# Patient Record
Sex: FEMALE | Race: ASIAN | NOT HISPANIC OR LATINO | Employment: UNEMPLOYED | URBAN - METROPOLITAN AREA
[De-identification: names, ages, dates, MRNs, and addresses within clinical notes are randomized per-mention and may not be internally consistent; named-entity substitution may affect disease eponyms.]

---

## 2018-06-08 ENCOUNTER — WALK IN (OUTPATIENT)
Dept: URGENT CARE | Age: 23
End: 2018-06-08

## 2018-06-08 ENCOUNTER — LAB SERVICES (OUTPATIENT)
Dept: LAB | Age: 23
End: 2018-06-08

## 2018-06-08 VITALS
RESPIRATION RATE: 16 BRPM | TEMPERATURE: 97.9 F | HEART RATE: 96 BPM | SYSTOLIC BLOOD PRESSURE: 84 MMHG | DIASTOLIC BLOOD PRESSURE: 53 MMHG | OXYGEN SATURATION: 97 % | WEIGHT: 122.2 LBS

## 2018-06-08 DIAGNOSIS — R39.9 GU (GENITOURINARY) SYMPTOMS: Primary | ICD-10-CM

## 2018-06-08 DIAGNOSIS — N39.0 URINARY TRACT INFECTION WITHOUT HEMATURIA, SITE UNSPECIFIED: ICD-10-CM

## 2018-06-08 DIAGNOSIS — R39.9 GU (GENITOURINARY) SYMPTOMS: ICD-10-CM

## 2018-06-08 LAB
APPEARANCE UR: ABNORMAL
B-HCG UR QL: NEGATIVE
BACTERIA #/AREA URNS HPF: ABNORMAL /HPF
BILIRUB UR QL STRIP: NEGATIVE
COLOR UR: ABNORMAL
GLUCOSE UR STRIP-MCNC: ABNORMAL MG/DL
HGB UR QL STRIP: ABNORMAL
HYALINE CASTS #/AREA URNS LPF: ABNORMAL /LPF (ref 0–5)
KETONES UR STRIP-MCNC: ABNORMAL MG/DL
LEUKOCYTE ESTERASE UR QL STRIP: ABNORMAL
MUCOUS THREADS URNS QL MICRO: PRESENT
NITRITE UR QL STRIP: ABNORMAL
PH UR STRIP: ABNORMAL UNITS (ref 5–7)
PROT UR STRIP-MCNC: ABNORMAL MG/DL
RBC #/AREA URNS HPF: ABNORMAL /HPF (ref 0–3)
SP GR UR STRIP: 1.02 (ref 1–1.03)
SPECIMEN SOURCE: ABNORMAL
SQUAMOUS #/AREA URNS HPF: ABNORMAL /HPF (ref 0–5)
UROBILINOGEN UR STRIP-MCNC: ABNORMAL MG/DL (ref 0–1)
WBC #/AREA URNS HPF: ABNORMAL /HPF (ref 0–5)

## 2018-06-08 PROCEDURE — 87077 CULTURE AEROBIC IDENTIFY: CPT | Performed by: INTERNAL MEDICINE

## 2018-06-08 PROCEDURE — 99214 OFFICE O/P EST MOD 30 MIN: CPT | Performed by: EMERGENCY MEDICINE

## 2018-06-08 PROCEDURE — 81001 URINALYSIS AUTO W/SCOPE: CPT | Performed by: INTERNAL MEDICINE

## 2018-06-08 PROCEDURE — 87086 URINE CULTURE/COLONY COUNT: CPT | Performed by: INTERNAL MEDICINE

## 2018-06-08 PROCEDURE — 87186 SC STD MICRODIL/AGAR DIL: CPT | Performed by: INTERNAL MEDICINE

## 2018-06-08 PROCEDURE — 81025 URINE PREGNANCY TEST: CPT | Performed by: INTERNAL MEDICINE

## 2018-06-08 RX ORDER — CIPROFLOXACIN 500 MG/1
500 TABLET, FILM COATED ORAL 2 TIMES DAILY
Qty: 14 TABLET | Refills: 0 | Status: SHIPPED | OUTPATIENT
Start: 2018-06-08 | End: 2018-06-15

## 2018-06-10 LAB
BACTERIA UR CULT: ABNORMAL
ORGANISM: ABNORMAL
REPORT STATUS (RPT): ABNORMAL
SPECIMEN SOURCE: ABNORMAL

## 2018-11-19 ENCOUNTER — WALK IN (OUTPATIENT)
Dept: URGENT CARE | Age: 23
End: 2018-11-19

## 2018-11-19 VITALS
DIASTOLIC BLOOD PRESSURE: 50 MMHG | SYSTOLIC BLOOD PRESSURE: 93 MMHG | BODY MASS INDEX: 22.23 KG/M2 | TEMPERATURE: 97.8 F | HEART RATE: 66 BPM | OXYGEN SATURATION: 100 % | WEIGHT: 120.8 LBS | HEIGHT: 62 IN

## 2018-11-19 DIAGNOSIS — R10.9 ABDOMINAL CRAMPING: Primary | ICD-10-CM

## 2018-11-19 DIAGNOSIS — N92.0 MENORRHAGIA WITH REGULAR CYCLE: ICD-10-CM

## 2018-11-19 PROCEDURE — 99214 OFFICE O/P EST MOD 30 MIN: CPT | Performed by: EMERGENCY MEDICINE

## 2020-07-08 ENCOUNTER — TELEPHONE (OUTPATIENT)
Dept: URGENT CARE | Age: 25
End: 2020-07-08

## 2020-07-08 ENCOUNTER — WALK IN (OUTPATIENT)
Dept: URGENT CARE | Age: 25
End: 2020-07-08

## 2020-07-08 VITALS
SYSTOLIC BLOOD PRESSURE: 100 MMHG | DIASTOLIC BLOOD PRESSURE: 62 MMHG | RESPIRATION RATE: 16 BRPM | OXYGEN SATURATION: 97 % | HEART RATE: 88 BPM | TEMPERATURE: 97 F

## 2020-07-08 DIAGNOSIS — J02.9 SORE THROAT: Primary | ICD-10-CM

## 2020-07-08 LAB — S PYO DNA THROAT QL NAA+PROBE: DETECTED

## 2020-07-08 PROCEDURE — 99213 OFFICE O/P EST LOW 20 MIN: CPT | Performed by: PHYSICIAN ASSISTANT

## 2020-07-08 PROCEDURE — 87635 SARS-COV-2 COVID-19 AMP PRB: CPT | Performed by: CLINICAL MEDICAL LABORATORY

## 2020-07-08 PROCEDURE — 87651 STREP A DNA AMP PROBE: CPT | Performed by: INTERNAL MEDICINE

## 2020-07-08 RX ORDER — AMOXICILLIN 500 MG/1
500 CAPSULE ORAL 3 TIMES DAILY
Qty: 30 CAPSULE | Refills: 0 | Status: SHIPPED | OUTPATIENT
Start: 2020-07-08 | End: 2020-12-15

## 2020-07-09 LAB
SARS-COV-2 RNA RESP QL NAA+PROBE: NOT DETECTED
SERVICE CMNT-IMP: NORMAL

## 2020-12-15 ENCOUNTER — WALK IN (OUTPATIENT)
Dept: URGENT CARE | Age: 25
End: 2020-12-15

## 2020-12-15 VITALS
HEART RATE: 78 BPM | TEMPERATURE: 97 F | OXYGEN SATURATION: 98 % | WEIGHT: 129 LBS | BODY MASS INDEX: 23.59 KG/M2 | SYSTOLIC BLOOD PRESSURE: 118 MMHG | DIASTOLIC BLOOD PRESSURE: 60 MMHG | RESPIRATION RATE: 18 BRPM

## 2020-12-15 DIAGNOSIS — L30.9 DERMATITIS: Primary | ICD-10-CM

## 2020-12-15 PROCEDURE — 99214 OFFICE O/P EST MOD 30 MIN: CPT | Performed by: EMERGENCY MEDICINE

## 2020-12-15 RX ORDER — MOMETASONE FUROATE 1 MG/G
CREAM TOPICAL DAILY
Qty: 45 G | Refills: 0 | Status: SHIPPED | OUTPATIENT
Start: 2020-12-15

## 2021-02-16 ENCOUNTER — WALK IN (OUTPATIENT)
Dept: URGENT CARE | Age: 26
End: 2021-02-16

## 2021-02-16 VITALS
SYSTOLIC BLOOD PRESSURE: 106 MMHG | DIASTOLIC BLOOD PRESSURE: 68 MMHG | OXYGEN SATURATION: 96 % | HEART RATE: 97 BPM | RESPIRATION RATE: 16 BRPM | BODY MASS INDEX: 24.19 KG/M2 | WEIGHT: 132.28 LBS | TEMPERATURE: 98 F

## 2021-02-16 DIAGNOSIS — L30.9 DERMATITIS: Primary | ICD-10-CM

## 2021-02-16 PROCEDURE — 99213 OFFICE O/P EST LOW 20 MIN: CPT | Performed by: FAMILY MEDICINE

## 2021-02-16 RX ORDER — DESONIDE 0.5 MG/G
CREAM TOPICAL 2 TIMES DAILY
Qty: 30 G | Refills: 0 | Status: SHIPPED | OUTPATIENT
Start: 2021-02-16

## 2021-02-16 ASSESSMENT — ENCOUNTER SYMPTOMS
DIARRHEA: 0
WOUND: 0
WEAKNESS: 0
PHOTOPHOBIA: 0
FEVER: 0
COUGH: 0
CONSTIPATION: 0
SHORTNESS OF BREATH: 0
ACTIVITY CHANGE: 0
CHILLS: 0
WHEEZING: 0
CHEST TIGHTNESS: 0
EYE REDNESS: 0
VOMITING: 0
NAUSEA: 0
SORE THROAT: 0
RHINORRHEA: 0
DIZZINESS: 0
ABDOMINAL PAIN: 0
LIGHT-HEADEDNESS: 0
CONFUSION: 0
AGITATION: 0

## 2021-03-09 ENCOUNTER — WALK IN (OUTPATIENT)
Dept: URGENT CARE | Age: 26
End: 2021-03-09

## 2021-03-09 VITALS
BODY MASS INDEX: 23.79 KG/M2 | WEIGHT: 130.07 LBS | OXYGEN SATURATION: 100 % | DIASTOLIC BLOOD PRESSURE: 62 MMHG | SYSTOLIC BLOOD PRESSURE: 98 MMHG | HEART RATE: 85 BPM | RESPIRATION RATE: 16 BRPM | TEMPERATURE: 97.5 F

## 2021-03-09 DIAGNOSIS — M54.2 NECK PAIN ON LEFT SIDE: ICD-10-CM

## 2021-03-09 DIAGNOSIS — N39.0 ACUTE UTI: ICD-10-CM

## 2021-03-09 DIAGNOSIS — R82.90 URINE ABNORMALITY: Primary | ICD-10-CM

## 2021-03-09 LAB
APPEARANCE UR: ABNORMAL
BACTERIA #/AREA URNS HPF: ABNORMAL /HPF
BILIRUB UR QL STRIP: NEGATIVE
COLOR UR: YELLOW
GLUCOSE UR STRIP-MCNC: NEGATIVE MG/DL
HGB UR QL STRIP: NEGATIVE
HYALINE CASTS #/AREA URNS LPF: ABNORMAL /LPF
KETONES UR STRIP-MCNC: 15 MG/DL
LEUKOCYTE ESTERASE UR QL STRIP: ABNORMAL
MUCOUS THREADS URNS QL MICRO: PRESENT
NITRITE UR QL STRIP: POSITIVE
PH UR STRIP: 6 [PH] (ref 5–7)
PROT UR STRIP-MCNC: NEGATIVE MG/DL
RBC #/AREA URNS HPF: ABNORMAL /HPF
SP GR UR STRIP: 1.01 (ref 1–1.03)
SQUAMOUS #/AREA URNS HPF: ABNORMAL /HPF
UROBILINOGEN UR STRIP-MCNC: 1 MG/DL
WBC #/AREA URNS HPF: ABNORMAL /HPF

## 2021-03-09 PROCEDURE — 99213 OFFICE O/P EST LOW 20 MIN: CPT | Performed by: EMERGENCY MEDICINE

## 2021-03-09 PROCEDURE — 87077 CULTURE AEROBIC IDENTIFY: CPT | Performed by: CLINICAL MEDICAL LABORATORY

## 2021-03-09 PROCEDURE — 81001 URINALYSIS AUTO W/SCOPE: CPT | Performed by: INTERNAL MEDICINE

## 2021-03-09 PROCEDURE — 87086 URINE CULTURE/COLONY COUNT: CPT | Performed by: INTERNAL MEDICINE

## 2021-03-09 RX ORDER — NITROFURANTOIN 25; 75 MG/1; MG/1
100 CAPSULE ORAL 2 TIMES DAILY
Qty: 10 CAPSULE | Refills: 0 | Status: SHIPPED | OUTPATIENT
Start: 2021-03-09 | End: 2021-03-14

## 2021-03-10 LAB
BACTERIA UR CULT: ABNORMAL
BACTERIA UR CULT: ABNORMAL

## 2021-03-30 ENCOUNTER — OFFICE VISIT (OUTPATIENT)
Dept: DERMATOLOGY | Age: 26
End: 2021-03-30
Attending: FAMILY MEDICINE

## 2021-03-30 VITALS — WEIGHT: 126.98 LBS | BODY MASS INDEX: 23.23 KG/M2

## 2021-03-30 DIAGNOSIS — L30.9 DERMATITIS: Primary | ICD-10-CM

## 2021-03-30 DIAGNOSIS — L70.0 ACNE VULGARIS: ICD-10-CM

## 2021-03-30 PROCEDURE — 99203 OFFICE O/P NEW LOW 30 MIN: CPT | Performed by: STUDENT IN AN ORGANIZED HEALTH CARE EDUCATION/TRAINING PROGRAM

## 2021-08-29 ENCOUNTER — WALK IN (OUTPATIENT)
Dept: URGENT CARE | Age: 26
End: 2021-08-29

## 2021-08-29 VITALS
RESPIRATION RATE: 18 BRPM | OXYGEN SATURATION: 98 % | TEMPERATURE: 97.4 F | HEART RATE: 84 BPM | DIASTOLIC BLOOD PRESSURE: 69 MMHG | SYSTOLIC BLOOD PRESSURE: 99 MMHG

## 2021-08-29 DIAGNOSIS — H10.13 ALLERGIC CONJUNCTIVITIS OF BOTH EYES: Primary | ICD-10-CM

## 2021-08-29 PROCEDURE — 99213 OFFICE O/P EST LOW 20 MIN: CPT | Performed by: EMERGENCY MEDICINE

## 2021-08-29 RX ORDER — KETOTIFEN FUMARATE 0.25 MG/ML
1 SOLUTION/ DROPS OPHTHALMIC 2 TIMES DAILY
Qty: 5 ML | Refills: 1 | Status: SHIPPED | OUTPATIENT
Start: 2021-08-29

## 2021-09-23 ENCOUNTER — OFFICE VISIT (OUTPATIENT)
Dept: DERMATOLOGY | Age: 26
End: 2021-09-23

## 2021-09-23 DIAGNOSIS — L30.9 ECZEMA, UNSPECIFIED TYPE: Primary | ICD-10-CM

## 2021-09-23 PROCEDURE — 99214 OFFICE O/P EST MOD 30 MIN: CPT | Performed by: DERMATOLOGY

## 2021-09-23 RX ORDER — TRIAMCINOLONE ACETONIDE 1 MG/G
CREAM TOPICAL
Qty: 453.6 G | Refills: 3 | Status: SHIPPED | OUTPATIENT
Start: 2021-09-23

## 2024-08-20 ENCOUNTER — HOSPITAL ENCOUNTER (EMERGENCY)
Facility: CLINIC | Age: 29
Discharge: HOME | End: 2024-08-20
Attending: EMERGENCY MEDICINE | Admitting: EMERGENCY MEDICINE
Payer: MEDICAID

## 2024-08-20 VITALS
HEART RATE: 77 BPM | BODY MASS INDEX: 21.79 KG/M2 | DIASTOLIC BLOOD PRESSURE: 62 MMHG | RESPIRATION RATE: 18 BRPM | WEIGHT: 123 LBS | HEIGHT: 63 IN | OXYGEN SATURATION: 96 % | TEMPERATURE: 97.7 F | SYSTOLIC BLOOD PRESSURE: 91 MMHG

## 2024-08-20 DIAGNOSIS — H57.89 IRRITATION OF RIGHT EYE: ICD-10-CM

## 2024-08-20 PROCEDURE — 250N000009 HC RX 250

## 2024-08-20 PROCEDURE — 99283 EMERGENCY DEPT VISIT LOW MDM: CPT

## 2024-08-20 RX ORDER — TETRACAINE HYDROCHLORIDE 5 MG/ML
1-2 SOLUTION OPHTHALMIC ONCE
Status: COMPLETED | OUTPATIENT
Start: 2024-08-20 | End: 2024-08-20

## 2024-08-20 RX ADMIN — TETRACAINE HYDROCHLORIDE 2 DROP: 5 SOLUTION OPHTHALMIC at 13:57

## 2024-08-20 RX ADMIN — FLUORESCEIN SODIUM 1 STRIP: 1 STRIP OPHTHALMIC at 13:56

## 2024-08-20 ASSESSMENT — COLUMBIA-SUICIDE SEVERITY RATING SCALE - C-SSRS
6. HAVE YOU EVER DONE ANYTHING, STARTED TO DO ANYTHING, OR PREPARED TO DO ANYTHING TO END YOUR LIFE?: NO
1. IN THE PAST MONTH, HAVE YOU WISHED YOU WERE DEAD OR WISHED YOU COULD GO TO SLEEP AND NOT WAKE UP?: NO
2. HAVE YOU ACTUALLY HAD ANY THOUGHTS OF KILLING YOURSELF IN THE PAST MONTH?: NO

## 2024-08-20 ASSESSMENT — VISUAL ACUITY
OD: 20/25;OTHER (SEE COMMENTS)
OS: 20/40

## 2024-08-20 ASSESSMENT — ACTIVITIES OF DAILY LIVING (ADL)
ADLS_ACUITY_SCORE: 35
ADLS_ACUITY_SCORE: 33

## 2024-08-20 NOTE — ED PROVIDER NOTES
Emergency Department Encounter   NAME: Jovanna Edwards  AGE: 28 year old female  YOB: 1995  MRN: [id number]    PCP: System, Provider Not In  ED PROVIDER: Jodi Glez PA-C    Evaluation Date & Time:   No admission date for patient encounter.    CHIEF COMPLAINT:  Eye Problem      Impression and Plan   MDM: 27 yo F with a history of eczema and seasonal allergies presents for evaluation of right eye redness and pain. Intermittent redness for a month. Pain is new today. No acute vision changes. No trauma. No contacts, glasses, new makeup/products. On arrival here patient is vitally stable. Afebrile. On my exam patient is in no acute distress. Conjunctival injection of lateral inferior right eye.     No headache or change in visual fields concerning for intracranial mass.  Bilateral intraocular pressures of 3 -not consistent with glaucoma.  Woods light examination without any evidence of corneal abrasion/ulcer or globe rupture. No chemosis. Exam isn't consistent with scleritis or retinal detachment. Visual acuity: left 20/40, right 20/25. No papilledema on my funduscopic exam, but concern for this based on urgent care visit today.     She has now completed treatment for bacterial conjunctivitis x 2 with erythromycin and Polytrim without relief.  There may be an aspect of allergic conjunctivitis given her history of asthma and reported seasonal allergies.  Though, given concern for papilledema from urgent care I think it would be best for her to have a dilated eye exam. She doesn't follow with an eye doctor. Attempted to contact [facility] to see if they would be able to fit her in today for dilated exam, but unfortunately because she is not established with them is unable to contact anyone.    I reviewed all exam findings with the patient.  Overall reassuring here.  Low suspicion for emergent process of her right eye redness and pain.  We discussed trying an over-the-counter allergy medication like Zyrtec or  Claritin to see if there is a component of allergic conjunctivitis.  We also discussed the importance of having a dilated eye exam.  I referred her to [facility]. She will go to their clinic after discharge to see if they can fit her in today/tomorrow.     We reviewed strict return precautions and patient was discharged home in stable condition.     I have staffed the patient with Dr. Heredia, ED physician, who will evaluate the patient and agrees with all aspects of today's care.          Medical Decision Making  Obtained supplemental history:Supplemental history obtained?: No  Reviewed external records: External records reviewed?: [facility]20[facility].  Intermittent right eye redness.  Pain today.  Possible papilledema right eye.  Referred to the emergency room for further evaluation.  Care impacted by chronic illness:N/A  Care significantly affected by social determinants of health:N/A  Did you consider but not order tests?: Work up considered but not performed and documented in chart, if applicable  Did you interpret images independently?: Independent interpretation of ECG and images noted in documentation, when applicable.  Consultation discussion with other provider:Did you involve another provider (consultant, , pharmacy, etc.)?: No  Discharge. No recommendations on prescription strength medication(s). N/A.   At the conclusion of the encounter I discussed the results of all the tests and the disposition. The questions were answered. The patient or family acknowledged understanding and was agreeable with the care plan.        FINAL IMPRESSION:    ICD-10-CM    1. Irritation of right eye  H57.89             MEDICATIONS GIVEN IN THE EMERGENCY DEPARTMENT:  Medications   fluorescein (FUL-HANG) ophthalmic strip 1 strip (1 strip Right Eye $Given by Other 8/20/24 7218)   tetracaine (PONTOCAINE) 0.5 % ophthalmic solution 1-2 drop (2 drops Right Eye $Given by Other  "8/20/24 8316)         NEW PRESCRIPTIONS STARTED AT TODAY'S ED VISIT:  New Prescriptions    No medications on file         HPI   Patient information was obtained from: patient   Use of Intrepreter: N/A     Jovanna Edwards is a 28 year old female with a pertinent history of eczema and seasonal allergies who presents to the ED by car for evaluation of eye irritation.  Patient states that over the last 2 months she has had intermittent right eye redness.  She has been treated with both erythromycin ointment and Polytrim drops.  This has not helped.  She has had intermittent discharge from the eye that is draining.  Now today she is noticed some pain on the lateral inferior quadrant of the eye.  She does not wear contacts or glasses.  She does not follow with an eye doctor.  States that she has had very gradual blurring of her vision over the last year, but no acute changes. No fever, chills, or trauma.     REVIEW OF SYSTEMS:  Pertinent positive and negative symptoms per HPI.       Medical History     No past medical history on file.    No past surgical history on file.    No family history on file.         No current outpatient medications on file.        Physical Exam     First Vitals:  Patient Vitals for the past 24 hrs:   BP Temp Temp src Pulse Resp SpO2 Height Weight   08/20/24 1331 121/82 97.7  F (36.5  C) Temporal 88 18 97 % 1.6 m (5' 3\") 55.8 kg (123 lb)       PHYSICAL EXAM:   General Appearance:  Alert, cooperative, no distress, appears stated age  HENT: Normocephalic without obvious deformity, atraumatic. Mucous membranes moist   Eyes: Lids normal. No discharge. Pupils equal and reactive to light bilaterally. EOM intact.   Visual acuity: Left: 20/40. Right: 20/25  Right eye: conjunctival injection of the lateral inferior quadrant. Positive red reflex. No papilledema.    Left eye: conjunctival clear. Positive red reflex. No papilledema.   Integument: Warm, dry, no rashes or lesions  Neurologic: Alert and orientated " x3.   Psych: Normal mood and affect      Results     LAB:  All pertinent labs reviewed and interpreted  Labs Ordered and Resulted from Time of ED Arrival to Time of ED Departure - No data to display    RADIOLOGY:  No orders to display         PROCEDURES:    PROCEDURE: Woods lamp Exam   INDICATIONS: Eye pain and irritation   PROCEDURE PROVIDER: Jodi Glez PA-C   SITE: right eye   CONSENT:  The risks, benefits and alternatives for this procedure were explained to the patient and verbally accepted.     MEDICATION: fluorescein stain and tetracaine   EXAM FINDINGS: Right Eye: No uptake concerning for corneal abrasion or ulcer.  No Mildred sign concerning for globe rupture.  No dendritic lesion concerning for zoster.   COMPLICATIONS: Patient tolerated procedure well, without complication     PROCEDURE: Intraocular Pressure Measurement   DEVICE USED: Tonopen   INDICATIONS: Eye pain   PROCEDURE PROVIDER: Jodi Glez PA-C   SITE: Eyes   MEDICATION: 0.5% Tetracaine ophthalmic drops were applied to both eyes   NOTE: Administered 2 drops of above solution, rapid anesthesia achieved. Using standard technique, performed Tonopen exam, which showed intraocular pressure(s) of;  3 mmHg in Right eye  3 mmHg in Left eye     COMPLICATIONS: Patient tolerated procedure well, without complication        Jodi Glez PA-C   Emergency Medicine   [facility] EMERGENCY ROOM       Jodi Glez PA-C  08/20/24 9987

## 2024-08-20 NOTE — DISCHARGE INSTRUCTIONS
You were seen in the emergency department for evaluation of right eye redness and irritation.  Thankfully, there are no signs of an emergent cause causing your symptoms.  There may be an element of allergic conjunctivitis.  You can try taking over-the-counter Zyrtec or Claritin daily to see if this helps your symptoms.    Please go to [facility] for further evaluation with a dilated eye exam.

## 2024-08-20 NOTE — ED TRIAGE NOTES
Pt presents with right eye redness, discomfort, and blurred vision that has worsened over the last month. Initially diagnosed antibiotic eye drops at walk-in clinic and had no improvement. Sent from [facility] today     Triage Assessment (Adult)       Row Name 08/20/24 1597          Triage Assessment    Airway WDL WDL        Respiratory WDL    Respiratory WDL WDL        Skin Circulation/Temperature WDL    Skin Circulation/Temperature WDL WDL        Cardiac WDL    Cardiac WDL WDL        Peripheral/Neurovascular WDL    Peripheral Neurovascular WDL WDL        Cognitive/Neuro/Behavioral WDL    Cognitive/Neuro/Behavioral WDL WDL

## 2024-08-20 NOTE — ED PROVIDER NOTES
Emergency Department Midlevel Supervisory Note    Date/Time:8/20/2024 2:01 PM    I personally saw the patient and performed a substantive portion of the visit including all aspects of the medical decision making.  I am seeing this patient along with Jodi Glez PA-C. Suleman WARD D.O., have reviewed the documentation, personally taken the patient's history, performed an exam and agree with the physical finds, diagnosis and management plan.    Prior records were reviewed.  I personally performed history and physical.  I personally reviewed lab, EKG, and radiology results as indicated.  Care was discussed with mid-level provider.  Diagnosis and disposition were discussed.  Final disposition will be per the PILAR depending diagnostic studies and patient's clinical trajectory.      ED Course:  2:01 PM Jodi Glez PA-C, staffed the patient with me    The patient presented to the emergency department today complaining of right eye pain and redness.  On exam there is conjunctival injection of the right eye.  No vision changes.  No signs of corneal abrasion on exam.  She has recently finished a course of antibiotic drops.  At this time I feel it would be best to have her follow-up closely with ophthalmology and [facility].  She is comfortable with the plan for discharge and close follow-up.    DIAGNOSIS:  1. Irritation of right eye          I, Óscar Abrams, am serving as a scribe to document services personally performed by Suleman Heredia DO, based on my observations and the provider's statements to me. I, Suleman Heredia DO attest that Óscar Abrams is acting in a scribe capacity, has observed my performance of the services and has documented them in accordance with my direction.      Suleman Heredia D.O.  Emergency Medicine  [facility] EMERGENCY ROOM  [address]  [id number]  [phone]  Dept: [phone]             Suleman Heredia,   08/28/24 1035